# Patient Record
Sex: MALE | Race: WHITE | NOT HISPANIC OR LATINO | Employment: OTHER | URBAN - METROPOLITAN AREA
[De-identification: names, ages, dates, MRNs, and addresses within clinical notes are randomized per-mention and may not be internally consistent; named-entity substitution may affect disease eponyms.]

---

## 2017-03-24 ENCOUNTER — GENERIC CONVERSION - ENCOUNTER (OUTPATIENT)
Dept: OTHER | Facility: OTHER | Age: 77
End: 2017-03-24

## 2017-03-28 ENCOUNTER — ALLSCRIPTS OFFICE VISIT (OUTPATIENT)
Dept: OTHER | Facility: OTHER | Age: 77
End: 2017-03-28

## 2017-06-22 ENCOUNTER — GENERIC CONVERSION - ENCOUNTER (OUTPATIENT)
Dept: OTHER | Facility: OTHER | Age: 77
End: 2017-06-22

## 2017-10-03 ENCOUNTER — ALLSCRIPTS OFFICE VISIT (OUTPATIENT)
Dept: OTHER | Facility: OTHER | Age: 77
End: 2017-10-03

## 2017-10-04 NOTE — PROGRESS NOTES
Assessment  Assessed   1  Arteriosclerosis of coronary artery (414 00) (I25 10)  2  Hyperlipidemia (272 4) (E78 5)  3  Paroxysmal atrial fibrillation (427 31) (I48 0)  4  Benign essential hypertension (401 1) (I10)  5  Aortic valve stenosis (424 1) (I35 0)    Plan  Arteriosclerosis of coronary artery    · Follow-up visit in 6 months Evaluation and Treatment  Follow-up  Status: Hold For -  Scheduling  Requested for: 03Apr2018  Ordered; For: Arteriosclerosis of coronary artery;  Ordered By: Hilario Zabala  Performed:     Due: 30ONM0902    Discussion/Summary  Cardiology Discussion Summary Free Text Note Form [facility]: It is my impression that the patient is doing well from a cardiovascular standpoint  He has no angina despite a complex history of CAD (CABG in 1998, stent in 2012 with a fair amount of residual disease post stent)  He has only mild edema when taking furosemide daily  There is no overt CHF (his EF is 45%)   He did have PAF associated with an illness in the hospital in December  We have seen this at no other time and in light of DAPT we will hold on systemic AC unless we demonstrate PAF at well times  He will remain on beta blockers and ace inhibitors for LV dysfunction and HTN (fair control  He is taking his statin for HLP (lipids excellent earlier this year on current dosage of atorvastatin)  His PCP has ordered repeat blood work for the near future  Note his AS is mild and should not be problematic for some time  I 1  will see him in 6 months time  1 Amended By: Hilario Zabala; Oct 03 2017 11:49 AM EST    Chief Complaint  Chief Complaint Free Text Note Form: 6 month FU for    known CAD s/p CABG/stenting and EF of 50%  Evens Mckee also has HPL and HTN  Evens Mckee PAF during kidney stone at [facility] in December 2016   Chief Complaint Chronic Condition  Juan M Nieto: Patient is here today for follow up of chronic conditions described in HPI        History of Present Illness  Cardiology HPI Free Text Note Form [facility] Juan M Nieto: Since his last visit he denies chest pain, shortness of breath, palpitations or edema  He does have some positional lightheadedness  Review of Systems  Cardiology Male ROS:     Cardiac: rhythm problems-and-has AS heart murmur present, but-no chest pain,-no fainting/blackouts,-no signs of swelling,-no palpitations present,-no syncope/fainting,-no AM fatigue-and-no witnessed apnea episodes  Skin: No complaints of nonhealing sores or skin rash  Genitourinary: frequent urination at night-and-kidney stones, but-no recurrent urinary tract infections,-no difficult urination,-no blood in urine,-no loss of bladder control,-no kidney problems,-no prostate problems-and-no erectile dysfunction   Psychological: No complaints of feeling depressed, anxiety, panic attacks, or difficulty concentrating  General: lack of energy/fatigue, but-no trouble sleeping,-no appetite changes,-no changes in weight,-no night sweats-and-no frequent infections  Respiratory: No complaints of shortness of breath, cough with sputum, or wheezing  HEENT: No complaints of serious problems, hearing problems, nose problems, throat problems, or snoring  Gastrointestinal: No complaints of liver problems, nausea, vomiting, heartburn, constipation, bloody stools, diarrhea, problems swallowing, adbominal pain, or rectal bleeding  Hematologic: No complaints of bleeding disorders, anemia, blood clots, or excessive brusing  Neurological: tingling-and-dizziness, but-no numbness,-no weakness,-no seizures,-no headaches,-no diplopia-and-no daytime sleepiness   Musculoskeletal: arthritis-and-back pain   ROS Reviewed:   ROS reviewed  Active Problems  Problems   1  Aortic valve stenosis (424 1) (I35 0)  2  APC (atrial premature contractions) (427 61) (I49 1)  3  Arteriosclerosis of coronary artery (414 00) (I25 10)  4  Benign essential hypertension (401 1) (I10)  5  Cervical spinal cord compression (336 9) (G95 20)  6   Chronic systolic congestive heart failure (428 22,428 0) (I50 22)  7  Diabetes mellitus (250 00) (E11 9)  8  Hyperlipidemia (272 4) (E78 5)  9  Obstructive sleep apnea (327 23) (G47 33)  10  Old myocardial infarction (412) (I25 2)  11  Paroxysmal atrial fibrillation (427 31) (I48 0)  12  Pulmonary hypertension (416 8) (I27 20)    Past Medical History  Active Problems And Past Medical History Reviewed: The active problems and past medical history were reviewed and updated today  Surgical History  Problems   1  History of CABG  2  History of Cataract Surgery  3  History of Cath Stent Placement  4  History of Cystoscopy With Insertion Of Ureteral Stent  5  History of Post Spinal Diskect Osteophytect Cerv Interspace Microdiscec  Surgical History Reviewed: The surgical history was reviewed and updated today  Family History  Family History   1  Family history of Hypertension  Family History Reviewed: The family history was reviewed and updated today  Social History  Problems    · Former smoker (E55 62) (T77 993)   · Social alcohol use (Z78 9)  Social History Reviewed: The social history was reviewed and updated today  The social history was reviewed and is unchanged  Current Meds  1  Aspirin 81 MG TABS; as needed; Therapy: 04ZCP3857 to Recorded  2  Atorvastatin Calcium 40 MG Oral Tablet; 1 tablet at bedtime; Therapy: 24VHC4749 to Recorded  3  Clopidogrel Bisulfate 75 MG Oral Tablet; TAKE 1 TABLET DAILY; Therapy: 59ZFL2260 to Recorded  4  DilTIAZem HCl  MG Oral Capsule Extended Release 24 Hour; 1 CAPSULE DAILY; Therapy: 40Fco4421 to Recorded  5  Docusate Sodium 100 MG Oral Tablet; Therapy: 21Yot8400 to Recorded  6  Furosemide 40 MG Oral Tablet; take 1 tablet daily prn; Therapy: 79DTS5246 to Recorded  7  HumuLIN 70/30 (70-30) 100 UNIT/ML Subcutaneous Suspension; USE AS DIRECTED; Therapy: 02SIO1025 to Recorded  8  Lisinopril 10 MG Oral Tablet; Therapy: 42FEJ9186 to Recorded  9   MetFORMIN HCl - 500 MG Oral Tablet; 2 tablets at dinner; Therapy: 02YYW0315 to Recorded  10  Metoprolol Tartrate 50 MG Oral Tablet; 1 TABLET TWICE DAILY; Therapy: 45GUD4693 to Recorded  11  Multivitamins Oral Capsule; Therapy: 08Dra0406 to Recorded  12  Polyethylene Glycol 3350 Oral Packet; Therapy: 79The6614 to Recorded  13  Potassium Chloride ER 10 MEQ Oral Capsule Extended Release; 1 tablet daily; Therapy: 28Qez7951 to Recorded  14  Tamsulosin HCl - 0 4 MG Oral Capsule; 1 TABLET AT NIGHT; Therapy: 22Quv1955 to Recorded  15  Vitamin D3 2000 UNIT Oral Tablet; Therapy: 64ZME4665 to Recorded  Medication List Reviewed: The medication list was reviewed and updated today  Allergies  Medication   1  No Known Drug Allergies    Vitals  Vital Signs    Recorded: 07QVJ0957 11:02AM   Heart Rate 60   Respiration 16   Systolic 161, LUE, Sitting   Diastolic 66, LUE, Sitting   Height 5 ft 6 in   Weight 202 lb    BMI Calculated 32 6   BSA Calculated 2 01     Physical Exam    Constitutional   General appearance: Abnormal  -obese elderly white male in NAD  Eyes   Conjunctiva and Sclera examination: Conjunctiva pink, sclera anicteric  Ears, Nose, Mouth, and Throat - Oropharynx: Clear, nares are clear, mucous membranes are moist    Neck   Neck and thyroid: Normal, supple, trachea midline, no thyromegaly  Pulmonary   Auscultation of lungs: Clear to auscultation, no rales, no rhonchi, no wheezing, good air movement  Cardiovascular   Auscultation of heart: Normal rate and rhythm, normal S1 and S2, no murmurs  -Regular with premature beats with grade 1-2 systolic murmur at base  no diastolic murmur rub or gallop  Carotid pulses: Normal, 2+ bilaterally  Pedal pulses: Abnormal  -diminished in LEs  Examination of extremities for edema and/or varicosities: Abnormal  -1+ edema bilat L>R  Abdomen   Abdomen: Non-tender and no distention  Liver and spleen: No hepatomegaly or splenomegaly      Musculoskeletal Gait and station: Abnormal -unsteady -Digits and nails: Normal without clubbing or cyanosis -Inspection/palpation of joints, bones, and muscles: Abnormal -kyphosis  Skin - Skin and subcutaneous tissue: Normal without rashes or lesions  Skin is warm and well perfused, normal turgor      Neurologic - Cranial nerves: II - XII intact -Sensation: Abnormal    Psychiatric - Orientation to person, place, and time: Normal -Mood and affect: Normal       Signatures   Electronically signed by : FRANSISCA Welch ; Oct  3 2017 11:48AM EST                       (Author)    Electronically signed by : FRANSISCA Welch ; Oct  3 2017 11:50AM EST                       (Author)

## 2017-10-30 ENCOUNTER — GENERIC CONVERSION - ENCOUNTER (OUTPATIENT)
Dept: OTHER | Facility: OTHER | Age: 77
End: 2017-10-30

## 2018-01-12 VITALS
WEIGHT: 198.13 LBS | BODY MASS INDEX: 31.84 KG/M2 | DIASTOLIC BLOOD PRESSURE: 70 MMHG | SYSTOLIC BLOOD PRESSURE: 120 MMHG | HEIGHT: 66 IN | HEART RATE: 80 BPM

## 2018-01-13 VITALS
SYSTOLIC BLOOD PRESSURE: 146 MMHG | RESPIRATION RATE: 16 BRPM | WEIGHT: 202 LBS | HEART RATE: 60 BPM | DIASTOLIC BLOOD PRESSURE: 66 MMHG | BODY MASS INDEX: 32.47 KG/M2 | HEIGHT: 66 IN

## 2018-09-14 RX ORDER — POLYETHYLENE GLYCOL 3350, SODIUM CHLORIDE, SODIUM BICARBONATE, POTASSIUM CHLORIDE 420; 11.2; 5.72; 1.48 G/4L; G/4L; G/4L; G/4L
POWDER, FOR SOLUTION ORAL
Refills: 0 | COMMUNITY
Start: 2018-08-17

## 2018-09-14 RX ORDER — CLOPIDOGREL BISULFATE 75 MG/1
1 TABLET ORAL DAILY
COMMUNITY
Start: 2014-12-11

## 2018-09-14 RX ORDER — ATORVASTATIN CALCIUM 40 MG/1
1 TABLET, FILM COATED ORAL
COMMUNITY
Start: 2015-12-01

## 2018-09-14 RX ORDER — LISINOPRIL 10 MG/1
TABLET ORAL
COMMUNITY
Start: 2014-12-11

## 2018-09-14 RX ORDER — CHOLECALCIFEROL (VITAMIN D3) 125 MCG
CAPSULE ORAL
COMMUNITY
Start: 2016-12-28

## 2018-09-14 RX ORDER — INSULIN HUMAN 100 [IU]/ML
INJECTION, SUSPENSION SUBCUTANEOUS
Refills: 3 | COMMUNITY
Start: 2018-08-22

## 2018-09-14 RX ORDER — GABAPENTIN 300 MG/1
CAPSULE ORAL
Refills: 3 | COMMUNITY
Start: 2018-07-26

## 2018-09-14 RX ORDER — TAMSULOSIN HYDROCHLORIDE 0.4 MG/1
CAPSULE ORAL
COMMUNITY
Start: 2016-12-28

## 2018-09-14 RX ORDER — FUROSEMIDE 40 MG/1
1 TABLET ORAL DAILY PRN
COMMUNITY
Start: 2014-12-11

## 2018-09-14 RX ORDER — METOPROLOL TARTRATE 50 MG/1
1 TABLET, FILM COATED ORAL 2 TIMES DAILY
COMMUNITY
Start: 2014-12-11

## 2018-09-14 RX ORDER — FLUTICASONE PROPIONATE 50 MCG
SPRAY, SUSPENSION (ML) NASAL
Refills: 3 | COMMUNITY
Start: 2018-07-19

## 2018-09-14 RX ORDER — ASPIRIN 81 MG
TABLET, DELAYED RELEASE (ENTERIC COATED) ORAL
COMMUNITY
Start: 2016-12-28

## 2018-09-14 RX ORDER — POTASSIUM CHLORIDE 750 MG/1
1 CAPSULE, EXTENDED RELEASE ORAL DAILY
COMMUNITY
Start: 2016-12-28

## 2018-09-14 RX ORDER — POLYETHYLENE GLYCOL 3350 17 G/17G
POWDER, FOR SOLUTION ORAL
COMMUNITY
Start: 2016-12-28

## 2018-09-14 RX ORDER — DILTIAZEM HYDROCHLORIDE 120 MG/1
1 CAPSULE, EXTENDED RELEASE ORAL DAILY
COMMUNITY
Start: 2016-12-28 | End: 2018-09-25 | Stop reason: ALTCHOICE

## 2018-09-24 PROBLEM — I10 ESSENTIAL HYPERTENSION: Status: ACTIVE | Noted: 2018-09-24

## 2018-09-24 PROBLEM — I48.0 PAF (PAROXYSMAL ATRIAL FIBRILLATION) (HCC): Status: ACTIVE | Noted: 2018-09-24

## 2018-09-24 PROBLEM — I25.10 CORONARY ARTERY DISEASE INVOLVING NATIVE CORONARY ARTERY OF NATIVE HEART WITHOUT ANGINA PECTORIS: Status: ACTIVE | Noted: 2018-09-24

## 2018-09-24 PROBLEM — E78.2 MIXED HYPERLIPIDEMIA: Status: ACTIVE | Noted: 2018-09-24

## 2018-09-24 PROBLEM — N40.0 BPH (BENIGN PROSTATIC HYPERPLASIA): Status: ACTIVE | Noted: 2018-09-24

## 2018-09-25 ENCOUNTER — OFFICE VISIT (OUTPATIENT)
Dept: CARDIOLOGY CLINIC | Facility: CLINIC | Age: 78
End: 2018-09-25
Payer: MEDICARE

## 2018-09-25 VITALS
HEART RATE: 80 BPM | BODY MASS INDEX: 32.72 KG/M2 | DIASTOLIC BLOOD PRESSURE: 64 MMHG | SYSTOLIC BLOOD PRESSURE: 130 MMHG | HEIGHT: 65 IN | WEIGHT: 196.4 LBS

## 2018-09-25 DIAGNOSIS — I10 ESSENTIAL HYPERTENSION: ICD-10-CM

## 2018-09-25 DIAGNOSIS — E78.2 MIXED HYPERLIPIDEMIA: ICD-10-CM

## 2018-09-25 DIAGNOSIS — I25.10 CORONARY ARTERY DISEASE INVOLVING NATIVE CORONARY ARTERY OF NATIVE HEART WITHOUT ANGINA PECTORIS: Primary | ICD-10-CM

## 2018-09-25 DIAGNOSIS — I48.0 PAF (PAROXYSMAL ATRIAL FIBRILLATION) (HCC): ICD-10-CM

## 2018-09-25 PROCEDURE — 99214 OFFICE O/P EST MOD 30 MIN: CPT | Performed by: INTERNAL MEDICINE

## 2018-09-25 PROCEDURE — 93000 ELECTROCARDIOGRAM COMPLETE: CPT | Performed by: INTERNAL MEDICINE

## 2018-09-25 RX ORDER — UREA 10 %
100 LOTION (ML) TOPICAL DAILY
COMMUNITY

## 2018-09-25 NOTE — PROGRESS NOTES
Cardiology Follow Up    William   1940  [id number]  [address] [facility] [place] [place] [address] [phone]    Reason for visit:  Overdue 6 month OV for Status post CABG (1998) and stenting (2012), ejection fraction 45% hypertension, hyperlipidemia and paroxysmal atrial fibrillation while he had a kidney stone in December 2016  Also, he has mild AS    1  Coronary artery disease involving native coronary artery of native heart without angina pectoris     2  PAF (paroxysmal atrial fibrillation) ([facility])  POCT ECG   3  Essential hypertension     4  Mixed hyperlipidemia         Interval History:    since the patient's last visit he has done well  He denies chest pain, shortness of breath, palpitations or edema  He does get some positional lightheadedness  He does go to the gym regularly      Patient Active Problem List   Diagnosis    Coronary artery disease involving native coronary artery of native heart without angina pectoris    PAF (paroxysmal atrial fibrillation) ([address] )    Essential hypertension    Mixed hyperlipidemia    BPH (benign prostatic hyperplasia)     Past Medical History:   Diagnosis Date    Spinal cord compression [facility])      Social History     Social History    Marital status:      Spouse name: N/A    Number of children: N/A    Years of education: N/A     Occupational History    Not on file  Social History Main Topics    Smoking status: Former Smoker    Smokeless tobacco: Never Used    Alcohol use Yes      Comment: occ      Drug use: No    Sexual activity: Not on file     Other Topics Concern    Not on file     Social History Narrative    No narrative on file      Family History   Problem Relation Age of Onset    Hypertension Other      Past Surgical History:   Procedure Laterality Date    CORONARY ANGIOPLASTY WITH STENT PLACEMENT      CORONARY ARTERY BYPASS GRAFT      SPINE SURGERY      URETERAL STENT PLACEMENT         Current Outpatient Prescriptions:     aspirin 81 MG tablet, Take by mouth, Disp: , Rfl:     atorvastatin (LIPITOR) 40 mg tablet, Take 1 tablet by mouth, Disp: , Rfl:     Cholecalciferol (VITAMIN D3) 2000 units TABS, Take by mouth, Disp: , Rfl:     clopidogrel (PLAVIX) 75 mg tablet, Take 1 tablet by mouth daily, Disp: , Rfl:     Docusate Sodium 100 MG capsule, Take by mouth, Disp: , Rfl:     fluticasone (FLONASE) 50 mcg/act nasal spray, SHAKE LIQUID AND INSTILL 2 SPRAYS IN EACH NOSTRIL D, Disp: , Rfl: 3    furosemide (LASIX) 40 mg tablet, Take 1 tablet by mouth daily as needed, Disp: , Rfl:     gabapentin (NEURONTIN) 300 mg capsule, TK ONE C PO  NIGHTLY, Disp: , Rfl: 3    HUMULIN 70/30 KWIKPEN (70-30) 100 units/mL injection pen, , Disp: , Rfl: 3    lisinopril (ZESTRIL) 10 mg tablet, Take by mouth, Disp: , Rfl:     metFORMIN (GLUCOPHAGE) 500 mg tablet, Take by mouth, Disp: , Rfl:     metoprolol tartrate (LOPRESSOR) 50 mg tablet, Take 1 tablet by mouth 2 (two) times a day, Disp: , Rfl:     Multiple Vitamin (MULTIVITAMINS PO), Take by mouth, Disp: , Rfl:     polyethylene glycol (MIRALAX) 17 g packet, Take by mouth, Disp: , Rfl:     potassium chloride (MICRO-K) 10 MEQ CR capsule, Take 1 tablet by mouth daily, Disp: , Rfl:     tamsulosin (FLOMAX) 0 4 mg, Take by mouth, Disp: , Rfl:     vitamin B-12 (CYANOCOBALAMIN) 100 MCG tablet, Take 100 mcg by mouth daily, Disp: , Rfl:     diltiazem (DILACOR XR) 120 MG 24 hr capsule, Take 1 capsule by mouth daily, Disp: , Rfl:     polyethylene glycol-electrolytes (NULYTELY) 4000 mL solution, , Disp: , Rfl: 0  Allergies   Allergen Reactions    No Active Allergies        Review of Systems:  Review of Systems   Constitutional: Positive for fatigue  Negative for appetite change, chills and unexpected weight change  Respiratory: Negative for cough, chest tightness, shortness of breath and wheezing      Cardiovascular: Negative for chest pain, palpitations and leg swelling  Gastrointestinal: Positive for constipation  Negative for abdominal pain, anal bleeding, diarrhea and nausea  Genitourinary: Negative for dysuria, frequency, hematuria and urgency  Musculoskeletal: Positive for arthralgias, back pain and gait problem  Negative for joint swelling  Neurological: Positive for light-headedness and numbness (rle)  Negative for speech difficulty and headaches  Psychiatric/Behavioral: Negative for agitation, behavioral problems, confusion and decreased concentration  Physical Exam:  Vitals:    09/25/18 1344   BP: 130/64   BP Location: Right arm   Patient Position: Standing   Cuff Size: Large   Pulse: 80   Weight: 89 1 kg (196 lb 6 4 oz)   Height: 5' 5" (1 651 m)     Physical Exam   Constitutional: He is oriented to person, place, and time  He appears well-developed and well-nourished  No distress  obese   HENT:   Head: Normocephalic and atraumatic  Mouth/Throat: No oropharyngeal exudate  Eyes: Conjunctivae are normal  No scleral icterus  Neck: Neck supple  Normal carotid pulses and no JVD present  Carotid bruit is not present  No thyromegaly present  Cardiovascular: Normal rate  An irregular rhythm present  Frequent extrasystoles are present  Exam reveals no gallop and no friction rub  Murmur heard  Crescendo decrescendo systolic (basal) murmur is present with a grade of 2/6    No diastolic murmur is present   Pulses:       Dorsalis pedis pulses are 0 on the right side, and 0 on the left side  Posterior tibial pulses are 0 on the right side, and 0 on the left side  Pulmonary/Chest: He has no decreased breath sounds  He has no wheezes  He has no rhonchi  He has no rales  Abdominal: Soft  He exhibits no mass  There is no hepatosplenomegaly  There is no tenderness  Musculoskeletal: He exhibits edema (1+ edema L>RLE) and deformity (kyphosis)  He exhibits no tenderness     Neurological: He is alert and oriented to person, place, and time  No cranial nerve deficit or sensory deficit  Weakness of LEs   Skin: Skin is warm and dry  No rash noted  No erythema  No pallor  Psychiatric: He has a normal mood and affect  His behavior is normal  Judgment and thought content normal        Discussion/Summary:  1  CAD status post remote CABG and more recent stenting  Did have some residual disease post stent  No evidence for angina on current regimen which includes dual antiplatelet agents and metoprolol  2  Paroxysmal atrial fibrillation in the setting of a kidney stone  No apparent recurrence  Has PACs on EKG  On dual antiplatelet therapy and hesitant to add systemic anticoagulation for 1 documented episode  Continue metoprolol for potential rate control  3  Essential hypertension  Good control on lisinopril and metoprolol  Continue same  4  Mixed hyperlipidemia  Cholesterol 120 with HDL cholesterol 48 LDL cholesterol 61 and triglycerides 55 on current dose of atorvastatin  Continue same  5  Mild left ventricular systolic dysfunction  No evidence for congestive heart failure  Mild edema which is chronic    Continue  40 mg of furosemide    Ok to stop ASA and PLavix one week prior to colon exam      Marylou Alfaro MD

## 2019-04-16 PROBLEM — I51.9 LEFT VENTRICULAR DYSFUNCTION: Status: ACTIVE | Noted: 2019-04-16
